# Patient Record
Sex: FEMALE | Race: AMERICAN INDIAN OR ALASKA NATIVE
[De-identification: names, ages, dates, MRNs, and addresses within clinical notes are randomized per-mention and may not be internally consistent; named-entity substitution may affect disease eponyms.]

---

## 2020-10-19 ENCOUNTER — HOSPITAL ENCOUNTER (EMERGENCY)
Dept: HOSPITAL 5 - ED | Age: 30
Discharge: HOME | End: 2020-10-19
Payer: MEDICAID

## 2020-10-19 VITALS — SYSTOLIC BLOOD PRESSURE: 109 MMHG | DIASTOLIC BLOOD PRESSURE: 66 MMHG

## 2020-10-19 DIAGNOSIS — M77.01: Primary | ICD-10-CM

## 2020-10-19 DIAGNOSIS — M25.521: ICD-10-CM

## 2020-10-19 DIAGNOSIS — Z79.899: ICD-10-CM

## 2020-10-19 DIAGNOSIS — Z98.890: ICD-10-CM

## 2020-10-19 NOTE — EMERGENCY DEPARTMENT REPORT
Upper Extremity





- HPI


Chief Complaint: Extremity Injury, Upper


Stated Complaint: RT ELBOW INJURY


Time Seen by Provider: 10/19/20 20:28


Upper Extremity: Right Elbow (Was at home and took between attacks but with the 

bed and got stuck squeezing her medial elbow region and when she pulled it out 

she reports feeling a popping sensation to the medial aspect of her right elbow 

which was followed by pain.  No numbness, no tingling, no pre-existing injury, 

no swelling noted patient states she is unable to extend her fully)


Occurred When: 1 Day


Symptoms: Yes Pain with Movement, Yes Limited Range of Movement, No Deformity, 

No Numbness, No Weakness, No Swelling, No Bruising/Ecchymosis, No Laceration or 

Abrasion





ED Review of Systems


ROS: 


Stated complaint: RT ELBOW INJURY


Other details as noted in HPI





Comment: All other systems reviewed and negative





ED Past Medical Hx





- Past Medical History


Previous Medical History?: No





- Surgical History


Past Surgical History?: Yes


Additional Surgical History: C section





- Medications


Home Medications: 


                                Home Medications











 Medication  Instructions  Recorded  Confirmed  Last Taken  Type


 


Ketorolac [Toradol] 10 mg PO Q6H PRN #10 tablet 10/19/20  Unknown Rx














Upper Extremity Exam





- Exam


General: 


Vital signs noted. No distress. Alert and acting appropriately.





Head and Torso: No HEENT Abnormality, No Neck Tenderness, No Chest/Lungs 

Abnormality, No Abdominal Tenderness, No Back Tenderness


Shoulder Exam: Yes Normal Range of Motion in Shoulder, No Shoulder Tenderness, 

No Clavicle Tenderness, No Shoulder Deformity, No AC Joint Tenderness


Arm Exam: No Arm/Humerus Tenderness, No Arm Deformity


Elbow: Yes Elbow Tenderness (Along the medial epicondyle of the right arm.  No 

swelling is noted full range of motion is noted with flexion and extension.  

Full range of motion with supination and pronation.  No tenderness to the 

olecranon fossa notes no swelling to the area as well.  No lymphangitis no 

broken skin), No Normal Range of Motion in Elbow, No Elbow Deformity


Forearm: No Forearm Tenderness, No Forearm Deformity, No Pain with Pronation, No

 Pain with Supination


Wrist: Yes Normal ROM in Wrist, No Wrist Tenderness, No Wrist Deformity, No 

Snuffbox Tenderness, No Pain with Axial Thumb Compression


Hand: Yes Normal ROM in Digit(s), No Hand Tenderness, No Hand Deformity, No 

Digit Tenderness, No Digit(s) Deformity, No Tendon Dysfunction


CMS Exam: No Broken Skin, No Normal Distal Pulses, No Normal Capillary Refill, 

No Normal Distal Sensation





ED Course


                                   Vital Signs











  10/19/20





  18:20


 


Temperature 98 F


 


Pulse Rate 62


 


Respiratory 16





Rate 


 


Blood Pressure 105/43





[Left] 


 


O2 Sat by Pulse 97





Oximetry 














ED Medical Decision Making





- Radiology Data


Radiology results: report reviewed





[facility] 


[address] [place] [address] 





XRay Report 


Signed 





 Patient: CLARISA SAHA MR#: [id number] 





 : 1990 Acct:[id number] 





 Age/Sex: 30 / F ADM Date: 10/19/20 





 Loc: ED 


 Attending Dr: 








 Ordering Physician: ED MD YASSINE 


 Date of Service: 10/19/20 


 Procedure(s): XR elbow 3+V RT 


 Accession Number(s): [id number] 





 cc: ED MD YASSINE 





 Fluoro Time In Minutes: 





 LEFT ELBOW 3 VIEWS 1833 





INDICATION: trauma, pain 





COMPARISON: None available. 





FINDINGS: Negative study 











Signer Name: Yuval Rubin MD 


Signed: 10/19/2020 7:05 PM 


Workstation Name: [id number] 








 Transcribed By: GJ 


 Dictated By: Yuval Rubin MD 


 Electronically Authenticated By: Yuval Rubin MD 


 Signed Date/Time: 10/19/20 1905 











 DD/DT: 10/19/20 1905 


 TD/TT: 


Critical care attestation.: 


If time is entered above; I have spent that time in minutes in the direct care 

of this critically ill patient, excluding procedure time.








ED Disposition


Clinical Impression: 


 Elbow pain, Epicondylitis





Disposition: DC-01 TO HOME OR SELFCARE


Is pt being admited?: No


Does the pt Need Aspirin: No


Condition: Stable


Instructions:  Ice Pack Application (ED)


Prescriptions: 


Ketorolac [Toradol] 10 mg PO Q6H PRN #10 tablet


 PRN Reason: Pain


Referrals: 


[facility],MD [Primary Care Provider] - 3-5 Days


[facility] [place] [Provider Group] - 3-5 Days

## 2020-10-19 NOTE — XRAY REPORT
LEFT ELBOW 3 VIEWS 1833



INDICATION: trauma, pain



COMPARISON: None available.



FINDINGS: Negative study







Signer Name: Yuavl Rubin MD 

Signed: 10/19/2020 7:05 PM

Workstation Name: [id number]